# Patient Record
Sex: MALE | Race: WHITE | NOT HISPANIC OR LATINO
[De-identification: names, ages, dates, MRNs, and addresses within clinical notes are randomized per-mention and may not be internally consistent; named-entity substitution may affect disease eponyms.]

---

## 2022-05-26 ENCOUNTER — APPOINTMENT (OUTPATIENT)
Dept: MEDICAL GROUP | Facility: CLINIC | Age: 67
End: 2022-05-26
Payer: MEDICARE

## 2023-04-20 ENCOUNTER — OFFICE VISIT (OUTPATIENT)
Dept: MEDICAL GROUP | Facility: CLINIC | Age: 68
End: 2023-04-20
Payer: MEDICARE

## 2023-04-20 VITALS
SYSTOLIC BLOOD PRESSURE: 126 MMHG | HEART RATE: 103 BPM | HEIGHT: 72 IN | TEMPERATURE: 97.8 F | WEIGHT: 172.7 LBS | BODY MASS INDEX: 23.39 KG/M2 | DIASTOLIC BLOOD PRESSURE: 82 MMHG | OXYGEN SATURATION: 96 %

## 2023-04-20 DIAGNOSIS — F10.10 ALCOHOL ABUSE: ICD-10-CM

## 2023-04-20 DIAGNOSIS — Z13.9 SCREENING DUE: ICD-10-CM

## 2023-04-20 DIAGNOSIS — Z59.812 HOUSING INSTABILITY AFTER RECENT HOMELESSNESS: ICD-10-CM

## 2023-04-20 DIAGNOSIS — Z59.811 HOUSING INSTABILITY DUE TO IMMINENT RISK OF HOMELESSNESS: ICD-10-CM

## 2023-04-20 PROCEDURE — 99203 OFFICE O/P NEW LOW 30 MIN: CPT | Mod: GE | Performed by: STUDENT IN AN ORGANIZED HEALTH CARE EDUCATION/TRAINING PROGRAM

## 2023-04-20 SDOH — ECONOMIC STABILITY - HOUSING INSECURITY: HOMELESS IN THE PAST 12 MONTHS: Z59.812

## 2023-04-20 SDOH — ECONOMIC STABILITY - HOUSING INSECURITY: HOUSING INSTABILITY WITH RISK OF HOMELESSNESS: Z59.811

## 2023-04-20 ASSESSMENT — ENCOUNTER SYMPTOMS
EYES NEGATIVE: 1
GASTROINTESTINAL NEGATIVE: 1
CARDIOVASCULAR NEGATIVE: 1
RESPIRATORY NEGATIVE: 1
MUSCULOSKELETAL NEGATIVE: 1
CONSTITUTIONAL NEGATIVE: 1
PSYCHIATRIC NEGATIVE: 1
NEUROLOGICAL NEGATIVE: 1

## 2023-04-20 ASSESSMENT — PATIENT HEALTH QUESTIONNAIRE - PHQ9: CLINICAL INTERPRETATION OF PHQ2 SCORE: 0

## 2023-04-20 NOTE — ASSESSMENT & PLAN NOTE
Provided patient with local community resources including homeless shelters and [facility].  Patient reports that he knows with the [employer] [facility], and does go to them.

## 2023-04-20 NOTE — ASSESSMENT & PLAN NOTE
"Offered colonoscopy, AAA screening, low-dose CT patient declined at this time.  States \"if he has a cancer or anything like that will be his time to go\"    Patient did consent to STI, HIV, hep C screening  "

## 2023-04-20 NOTE — ASSESSMENT & PLAN NOTE
Offered referral to cessation counseling as well as local resources.  Patient reports he will call AA when he wants to get sober, this time though he is contemplating.  Patient reports a history of 12 years being sober in the past  -Today we will get CBC, CMP, lipid, A1c, TSH.  -Recommend continuing hep C and HIV as well as STI screening  -Patient provided with brochure of local community resources when he desires to quit.

## 2023-04-20 NOTE — PROGRESS NOTES
Subjective:     CC:  Diagnoses of Alcohol abuse, Screening due, Housing instability due to imminent risk of homelessness, and Housing instability after recent homelessness were pertinent to this visit.    HISTORY OF THE PRESENT ILLNESS: Patient is a 68 y.o. male. This pleasant patient is here today to establish care, no concerns at this time. His/her prior PCP was None.    Patient inebriated on this visit.  Reports having multiple vodka drinks prior to appointment.  Arrived via friend and will have ride home afterwards.  Patient reports transient lifestyle moving in and out of [place] multiple times.  Reports being back for a few months would like establishing with a primary care physician.    Patient reports a very active lifestyle would like to be outside as much as possible reports history of scuba diving, skydiving, angulating, hiking and living outdoors.    Family hx of stroke and CAD.  The patient denies any symptoms himself, denies any past medical history that is significant other than a chainsaw accident to his right forearm which required stitches breaking his toe no other history of surgery or any medical concerns.    Patient currently reports using meth and alcohol as well as smoking marijuana and cigarettes.  Patient offered multiple cessation techniques declines at this time states he will join AA if he would like to quit.  Does report that he has thought about quitting recently and reports a history of 12 years being sober in the past.    Problem   Alcohol Abuse    Patient reports multiple drinks of vodka prior to examination today.  History of daily drinking reports significant drinking in his past.  Inebriated during exam she also reports a history of methamphetamine and marijuana use.  Declines desire to quit at this time though is contemplating.       Screening Due   Housing Instability After Recent Homelessness    Currently staying with his friend reports a history of housing instability in the past  which is why he left renal last year.  Patient was transient living outdoors when he can with [place] [facility] [place].  Patient reports having has at this time unsure how long he will be and how long he will be in the area.         No current [facility]-ordered outpatient medications on file.     No current [facility]-ordered facility-administered medications on file.       Health Maintenance: Discussed healthcare maintenance with patient at this time including screenings to get him up-to-date.  Patient would likely need further follow-up for additional vaccinations    ROS:   Review of Systems   Constitutional: Negative.    HENT: Negative.     Eyes: Negative.    Respiratory: Negative.     Cardiovascular: Negative.    Gastrointestinal: Negative.    Genitourinary: Negative.    Musculoskeletal: Negative.    Skin: Negative.    Neurological: Negative.    Endo/Heme/Allergies: Negative.    Psychiatric/Behavioral: Negative.         Objective:       Exam: /82 (BP Location: Left arm, Patient Position: Sitting, BP Cuff Size: Adult)   Pulse (!) 103   Temp 36.6 °C (97.8 °F) (Temporal)   Ht 1.829 m (6')   Wt 78.3 kg (172 lb 11.2 oz)   SpO2 96%  Body mass index is 23.42 kg/m².    Physical Exam  Constitutional:       Comments: Intoxicated on exam   HENT:      Head: Normocephalic and atraumatic.      Right Ear: Tympanic membrane normal.      Left Ear: Tympanic membrane normal.      Nose: Nose normal.      Mouth/Throat:      Mouth: Mucous membranes are moist.      Pharynx: Oropharynx is clear.   Eyes:      Extraocular Movements: Extraocular movements intact.      Pupils: Pupils are equal, round, and reactive to light.   Cardiovascular:      Rate and Rhythm: Normal rate and regular rhythm.      Pulses: Normal pulses.      Heart sounds: Normal heart sounds.   Pulmonary:      Effort: Pulmonary effort is normal.      Breath sounds: Normal breath sounds.   Abdominal:      General: Abdomen is flat.       "Palpations: Abdomen is soft.   Musculoskeletal:         General: Normal range of motion.      Cervical back: Normal range of motion and neck supple.   Skin:     General: Skin is warm and dry.      Capillary Refill: Capillary refill takes less than 2 seconds.   Neurological:      General: No focal deficit present.      Mental Status: He is alert.         Labs: None    Assessment & Plan:   68 y.o. male with the following -    Problem List Items Addressed This Visit       Alcohol abuse     Offered referral to cessation counseling as well as local resources.  Patient reports he will call AA when he wants to get sober, this time though he is contemplating.  Patient reports a history of 12 years being sober in the past  -Today we will get CBC, CMP, lipid, A1c, TSH.  -Recommend continuing hep C and HIV as well as STI screening  -Patient provided with brochure of local community resources when he desires to quit.         Relevant Orders    CBC WITH DIFFERENTIAL    Comp Metabolic Panel    Lipid Profile    HEMOGLOBIN A1C    HEP C VIRUS ANTIBODY    HIV AG/AB COMBO ASSAY SCREENING    Chlamydia/GC, PCR (Urine)    RPR ([facility])    Screening due     Offered colonoscopy, AAA screening, low-dose CT patient declined at this time.  States \"if he has a cancer or anything like that will be his time to go\"    Patient did consent to STI, HIV, hep C screening         Housing instability after recent homelessness     Provided patient with local community resources including homeless shelters and [employer] [facility].  Patient reports that he knows with the [employer] [facility], and does go to them.          Other Visit Diagnoses       Housing instability due to imminent risk of homelessness                  Return in about 1 month (around 5/20/2023).    Please note that this dictation was created using voice recognition software. I have made every reasonable attempt to correct obvious errors, but I expect that there are errors of grammar and possibly content " that I did not discover before finalizing the note.